# Patient Record
Sex: MALE | Employment: FULL TIME | URBAN - METROPOLITAN AREA
[De-identification: names, ages, dates, MRNs, and addresses within clinical notes are randomized per-mention and may not be internally consistent; named-entity substitution may affect disease eponyms.]

---

## 2018-10-23 ENCOUNTER — THERAPY VISIT (OUTPATIENT)
Dept: PHYSICAL THERAPY | Facility: CLINIC | Age: 28
End: 2018-10-23
Payer: OTHER MISCELLANEOUS

## 2018-10-23 DIAGNOSIS — M54.50 ACUTE RIGHT-SIDED LOW BACK PAIN WITHOUT SCIATICA: Primary | ICD-10-CM

## 2018-10-23 PROCEDURE — 97161 PT EVAL LOW COMPLEX 20 MIN: CPT | Mod: GP | Performed by: PHYSICAL THERAPIST

## 2018-10-23 PROCEDURE — 97110 THERAPEUTIC EXERCISES: CPT | Mod: GP | Performed by: PHYSICAL THERAPIST

## 2018-10-23 PROCEDURE — 97530 THERAPEUTIC ACTIVITIES: CPT | Mod: GP | Performed by: PHYSICAL THERAPIST

## 2018-10-23 NOTE — PROGRESS NOTES
[facility] Initial Evaluation        Subjective:  Patient is a 28 year old male presenting with rehab back hpi.   Alexandro Quinones is a 28 year old male with a lumbar condition.  Condition occurred with:  Lifting (Pt. injured his low back at work on 10-18 lifting a heavy load (65 lbs) and carrying it across the room. The next day was worse with difficulty with all movt. Since then he notes significant improvement with almost no pain or limitation. No hx of LBP.).  Condition occurred: at work.    10-18-18.    Patient reports pain:  Lumbar spine right.  Radiates to:  No radiation.  Pain is described as aching and is intermittent and reported as 1/10.  Associated symptoms:  Loss of strength. Worse during: no time pattern.  Symptoms are exacerbated by lifting and other (transfers) and relieved by rest.  Since onset symptoms are rapidly improving.  Special testing: none.  Previous treatment: none.    General health as reported by patient is fair.                                              Objective:  Standing Alignment:        Lumbar:  Normal  Pelvic:  Normal          Gait:    Gait Type:  Normal         Flexibility/Screens:   Positive screens:  Lumbar        Spine:  Normal           Lumbar/SI Evaluation  ROM:  AROM Lumbar: normal    Strength: lower abdominals 4/5; lumbar extensors 4/5  Lumbar Myotomes:  normal                Lumbar Dermtomes:  normal                Neural Tension/Mobility:      Left side:SLR; Slump or Irvin-Lumbar  negative.   Right side:   Irvin-Lumbar positive.  Right side:   SLR  negative.   Lumbar Palpation:  normal          Spinal Segmental Conclusions: No pain with P/A glide lumbar spine   Normal segmental mobility lumbar spine          SI joint/Sacrum:    SI joint provocation tests (-)                                                       General     ROS    Assessment/Plan:    Patient is a 28 year old male with lumbar complaints.    Patient has the following significant  findings with corresponding treatment plan.                Diagnosis 1:  LBP  Pain -  self management and education  Decreased strength - therapeutic exercise and therapeutic activities  Impaired muscle performance - neuro re-education  Decreased function - therapeutic activities    Therapy Evaluation Codes:   1) History comprised of:   Personal factors that impact the plan of care:      None.    Comorbidity factors that impact the plan of care are:      Overweight.     Medications impacting care: None.  2) Examination of Body Systems comprised of:   Body structures and functions that impact the plan of care:      Lumbar spine.   Activity limitations that impact the plan of care are:      Lifting.  3) Clinical presentation characteristics are:   Stable/Uncomplicated.  4) Decision-Making    Low complexity using standardized patient assessment instrument and/or measureable assessment of functional outcome.  Cumulative Therapy Evaluation is: Low complexity.    Previous and current functional limitations:  (See Goal Flow Sheet for this information)    Short term and Long term goals: (See Goal Flow Sheet for this information)     Communication ability:  Patient appears to be able to clearly communicate and understand verbal and written communication and follow directions correctly.  Treatment Explanation - The following has been discussed with the patient:   RX ordered/plan of care  Anticipated outcomes  Possible risks and side effects  This patient would benefit from PT intervention to resume normal activities.   Rehab potential is good.    Frequency:  2 X week, once daily  Duration:  for 1-2 weeks  Discharge Plan:  Achieve all LTG.  Independent in home treatment program.  Reach maximal therapeutic benefit.    Please refer to the daily flowsheet for treatment today, total treatment time and time spent performing 1:1 timed codes.

## 2018-10-23 NOTE — LETTER
[facility]   [address] [address]  [phone]    2018    Re: Alexandro Quinones   :   1990  MRN:  [id number]   REFERRING PHYSICIAN:   WENDY Geronimo    [facility] PH[facility]ICAL [facility]  Date of Initial Evaluation:  10/23/18  Visits:  Rxs Used: 1  Reason for Referral:  Acute right-sided low back pain without sciatica    EVALUATION SUMMARY    [facility]    Subjective:  Patient is a 28 year old male presenting with rehab back hpi.   Alexandro Quinones is a 28 year old male with a lumbar condition.  Condition occurred with:  Lifting (Pt. injured his low back at work on 10-18 lifting a heavy load (65 lbs) and carrying it across the room. The next day was worse with difficulty with all movt. Since then he notes significant improvement with almost no pain or limitation. No hx of LBP.).  Condition occurred: at work.    10-18-18.    Patient reports pain:  Lumbar spine right.  Radiates to:  No radiation.  Pain is described as aching and is intermittent and reported as 1/10.  Associated symptoms:  Loss of strength. Worse during: no time pattern.  Symptoms are exacerbated by lifting and other (transfers) and relieved by rest.  Since onset symptoms are rapidly improving.  Special testing: none.  Previous treatment: none.    General health as reported by patient is fair.  Pertinent medical history includes:  Depression and overweight.  Current medications:  Steroids.  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    Primary job tasks include:  Lifting, prolonged standing and repetitive tasks.                Objective:  Standing Alignment:    Lumbar:  Normal  Pelvic:  Normal    Gait:    Gait Type:  Normal       Flexibility/Screens:   Positive screens:  Lumbar    Spine:  Normal      Lumbar/SI Evaluation  ROM:  AROM Lumbar: normal  Strength: lower  abdominals 4/5; lumbar extensors 4/5    Lumbar Myotomes:  normal    Lumbar Dermtomes:  normal      Re: Alexandro Quinones    Neural Tension/Mobility:    Left side:SLR; Slump or Irvin-Lumbar  negative.   Right side:   Irvin-Lumbar positive.  Right side:   SLR  negative.     Lumbar Palpation:  normal    Spinal Segmental Conclusions: No pain with P/A glide lumbar spine   Normal segmental mobility lumbar spine    SI joint/Sacrum:    SI joint provocation tests (-)    Assessment/Plan:    Patient is a 28 year old male with lumbar complaints.    Patient has the following significant findings with corresponding treatment plan.                Diagnosis 1:  LBP  Pain -  self management and education  Decreased strength - therapeutic exercise and therapeutic activities  Impaired muscle performance - neuro re-education  Decreased function - therapeutic activities    Therapy Evaluation Codes:   1) History comprised of:   Personal factors that impact the plan of care:      None.    Comorbidity factors that impact the plan of care are:      Overweight.     Medications impacting care: None.  2) Examination of Body Systems comprised of:   Body structures and functions that impact the plan of care:      Lumbar spine.   Activity limitations that impact the plan of care are:      Lifting.  3) Clinical presentation characteristics are:   Stable/Uncomplicated.  4) Decision-Making    Low complexity using standardized patient assessment instrument and/or measureable assessment of functional outcome.  Cumulative Therapy Evaluation is: Low complexity.    Previous and current functional limitations:  (See Goal Flow Sheet for this information)    Short term and Long term goals: (See Goal Flow Sheet for this information)     Communication ability:  Patient appears to be able to clearly communicate and understand verbal and written communication and follow directions correctly.  Treatment Explanation - The following has been discussed with the  patient:   RX ordered/plan of care  Anticipated outcomes  Possible risks and side effects  This patient would benefit from PT intervention to resume normal activities.   Rehab potential is good.    Frequency:  2 X week, once daily  Duration:  for 1-2 weeks  Discharge Plan:  Achieve all LTG.  Independent in home treatment program.  Reach maximal therapeutic benefit.  Re: Alexandro Quinones    Please refer to the daily flowsheet for treatment today, total treatment time and time spent performing 1:1 timed codes.     Thank you for your referral.    INQUIRIES  Therapist: Debbi Banks, PT   [facility] [place] [facility] PH[facility]ICAL THERAPY  [address] [place] [id number]  Phone: [phone]  Fax: [phone]

## 2018-10-23 NOTE — MR AVS SNAPSHOT
After Visit Summary   10/23/2018    Alexandro Quinones    MRN: [id number]           Patient Information     Date Of Birth          1990        Visit Information        Provider Department      10/23/2018 8:10 AM Debbi Banks PT [facility] Physical Therapy        Today's Diagnoses     Acute right-sided low back pain without sciatica    -  1       Follow-ups after your visit        Your next 10 appointments already scheduled     Oct 26, 2018 10:10 AM CDT   SOHEILA Spine with Mindy Medrano PTA   [facility] Physical Therapy ([facility]  )    [address] [place] [address] [phone]              Who to contact     If you have questions or need follow up information about today's clinic visit or your schedule please contact [facility] ATH[facility]IC [facility] PHYSICAL [facility] directly at [phone].  Normal or non-critical lab and imaging results will be communicated to you by MyChart, letter or phone within 4 business days after the clinic has received the results. If you do not hear from us within 7 days, please contact the clinic through MyChart or phone. If you have a critical or abnormal lab result, we will notify you by phone as soon as possible.  Submit refill requests through [employer] or call your pharmacy and they will forward the refill request to us. Please allow 3 business days for your refill to be completed.          Additional Information About Your Visit        Care EveryWhere ID     This is your Care EveryWhere ID. This could be used by other organizations to access your [place] medical records  [id number]         Blood Pressure from Last 3 Encounters:   No data found for BP    Weight from Last 3 Encounters:   No data found for Wt              We Performed the Following     SOHEILA Inital Eval Report     PT Eval, Low Complexity (15608)     Therapeutic Activities     Therapeutic Exercises         Primary Care Provider Fax #    Physician No Ref-Primary [phone]       No address on file        Equal Access to Services     RUSSELLAMIE HEDY : Hadii aad ku hadestersandra Tawannaali, priscila jamearoldoha, genie diego robertshahriar, tesfaye enriquetain hayaajamey leslee ness labhaktijamey naida. So [facility] [phone].    ATENCIÓN: Si habla español, tiene a hewitt disposición servicios gratuitos de asistencia lingüística. Llame al [phone].    We comply with applicable federal civil rights laws and [place] laws. We do not discriminate on the basis of race, color, national origin, age, disability, sex, sexual orientation, or gender identity.            Thank you!     Thank you for choosing [place] [facility] PHYSICAL THERAPY  for your care. Our goal is always to provide you with excellent care. Hearing back from our patients is one way we can continue to improve our services. Please take a few minutes to complete the written survey that you may receive in the mail after your visit with us. Thank you!             Your Updated Medication List - Protect others around you: Learn how to safely use, store and throw away your medicines at www.disposemymeds.org.      Notice  As of 10/23/2018  9:39 AM    You have not been prescribed any medications.

## 2018-10-24 NOTE — PROGRESS NOTES
[facility] Initial Evaluation  Subjective:  Patient is a 28 year old male presenting with rehab back hpi.                                      Pertinent medical history includes:  Depression and overweight.      Current medications:  Steroids.  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    Primary job tasks include:  Lifting, prolonged standing and repetitive tasks.                                Objective:  System    Physical Exam    General     ROS    Assessment/Plan:

## 2018-10-26 ENCOUNTER — THERAPY VISIT (OUTPATIENT)
Dept: PHYSICAL THERAPY | Facility: CLINIC | Age: 28
End: 2018-10-26
Payer: OTHER MISCELLANEOUS

## 2018-10-26 DIAGNOSIS — M54.50 ACUTE RIGHT-SIDED LOW BACK PAIN WITHOUT SCIATICA: ICD-10-CM

## 2018-10-26 PROCEDURE — 97530 THERAPEUTIC ACTIVITIES: CPT | Mod: GP

## 2018-10-26 PROCEDURE — 97035 APP MDLTY 1+ULTRASOUND EA 15: CPT | Mod: GP

## 2018-10-26 PROCEDURE — 97110 THERAPEUTIC EXERCISES: CPT | Mod: GP

## 2018-10-30 ENCOUNTER — THERAPY VISIT (OUTPATIENT)
Dept: PHYSICAL THERAPY | Facility: CLINIC | Age: 28
End: 2018-10-30
Payer: OTHER MISCELLANEOUS

## 2018-10-30 DIAGNOSIS — M54.50 ACUTE RIGHT-SIDED LOW BACK PAIN WITHOUT SCIATICA: ICD-10-CM

## 2018-10-30 PROCEDURE — 97110 THERAPEUTIC EXERCISES: CPT | Mod: GP

## 2018-10-30 PROCEDURE — 97530 THERAPEUTIC ACTIVITIES: CPT | Mod: GP

## 2018-10-30 NOTE — MR AVS SNAPSHOT
After Visit Summary   10/30/2018    Alexandro Quinones    MRN: [id number]           Patient Information     Date Of Birth          1990        Visit Information        Provider Department      10/30/2018 10:10 AM Mindy Medrano PTA [facility] Physical Therapy        Today's Diagnoses     Acute right-sided low back pain without sciatica           Follow-ups after your visit        Your next 10 appointments already scheduled     Nov 05, 2018 10:10 AM CST   SOHEILA Spine with Mindy Medrano PTA   [facility] Physical Therapy ([facility]  )    [address] [place] [id number] [phone]              Who to contact     If you have questions or need follow up information about today's clinic visit or your schedule please contact [facility] ATHLETIC [facility] PHYSICAL THERAPY directly at [phone].  Normal or non-critical lab and imaging results will be communicated to you by MyChart, letter or phone within 4 business days after the clinic has received the results. If you do not hear from us within 7 days, please contact the clinic through MyChart or phone. If you have a critical or abnormal lab result, we will notify you by phone as soon as possible.  Submit refill requests through [employer] or call your pharmacy and they will forward the refill request to us. Please allow 3 business days for your refill to be completed.          Additional Information About Your Visit        Care EveryWhere ID     This is your Care EveryWhere ID. This could be used by other organizations to access your [place] medical records  [id number]         Blood Pressure from Last 3 Encounters:   No data found for BP    Weight from Last 3 Encounters:   No data found for Wt              We Performed the Following     Therapeutic Activities     Therapeutic Exercises        Primary Care Provider Fax #    Physician No Ref-Primary  [phone]       No address on file        Equal Access to Services     GAYE HEDY : Hadii aad ku hadestersandra Jerry, priscila doan, genie lopezmagerardo guthrie, tesfaye gandhiangelitoperla pascal. So [facility] [phone].    ATENCIÓN: Si habla español, tiene a hewitt disposición servicios gratuitos de asistencia lingüística. Llame al [phone].    We comply with applicable federal civil rights laws and [place] laws. We do not discriminate on the basis of race, color, national origin, age, disability, sex, sexual orientation, or gender identity.            Thank you!     Thank you for choosing [place] [facility] PHYSICAL THERAPY  for your care. Our goal is always to provide you with excellent care. Hearing back from our patients is one way we can continue to improve our services. Please take a few minutes to complete the written survey that you may receive in the mail after your visit with us. Thank you!             Your Updated Medication List - Protect others around you: Learn how to safely use, store and throw away your medicines at www.disposemymeds.org.      Notice  As of 10/30/2018 12:43 PM    You have not been prescribed any medications.

## 2018-10-30 NOTE — PROGRESS NOTES
Subjective:  HPI                    Objective:  System    Physical Exam    General     ROS    Assessment/Plan:    SUBJECTIVE  Subjective changes as noted by pt:   Back is feeling pretty good, could stand 3 hours yesterday, but now can stand as long as he chooses. As long as he's moving around he is fine.    Current pain level: 1/10 Current Pain level: 1/10   Changes in function:  Yes (See Goal flowsheet attached for changes in current functional level)     Adverse reaction to treatment or activity:  None    OBJECTIVE  Changes in objective findings:  Yes,   Objective: Lumbar AROM WNL, freq exten stretches helpful throughout the day due to long periods spent leaning forward for job tasks Abdom strength 4/5, improved control with mini bridging. Lifts up to 35# waist to floor for leg work in clinic        ASSESSMENT  Alexandro continues to require intervention to meet STG and LTG's: PT  Patient's symptoms are resolving.  Response to therapy has shown an improvement in  pain level, ROM , muscle control and body mechanics  Progress made towards STG/LTG?  Yes (See Goal flowsheet attached for updates on achievement of STG and LTG)    PLAN  Current treatment program is being advanced to more complex exercises.    PTA/ATC plan:  Will continue with present plan of care.    Please refer to the daily flowsheet for treatment today, total treatment time and time spent performing 1:1 timed codes.

## 2018-11-05 ENCOUNTER — THERAPY VISIT (OUTPATIENT)
Dept: PHYSICAL THERAPY | Facility: CLINIC | Age: 28
End: 2018-11-05
Payer: OTHER MISCELLANEOUS

## 2018-11-05 DIAGNOSIS — M54.50 ACUTE RIGHT-SIDED LOW BACK PAIN WITHOUT SCIATICA: ICD-10-CM

## 2018-11-05 PROCEDURE — 97530 THERAPEUTIC ACTIVITIES: CPT | Mod: GP

## 2018-11-05 PROCEDURE — 97110 THERAPEUTIC EXERCISES: CPT | Mod: GP

## 2018-11-05 NOTE — LETTER
[facility] PHYSICAL THERAPY  [address] [place] [address]  [phone]    2018    Re: Alexandro Quinones   :   1990  MRN:  [id number]   REFERRING PHYSICIAN:   Geri Rice PA-C    [facility] ATHLET[facility] PHYSICAL [facility]  Date of Initial Evaluation:  10/23/18  Visits:  Rxs Used: 4  Reason for Referral:  Acute right-sided low back pain without sciatica     DISCHARGE REPORT  Progress reporting period is from 10/23/18 to 18.       SUBJECTIVE  Subjective changes noted by patient:   Pt reports muscle soreness in legs for 3 days after last visit. Not used to working out with squats as we did. Back has been feeling good.     Current pain level is 0/10  .     Previous pain level was  1/10 Initial Pain level: 1/10.   Changes in function:  Yes (See Goal flowsheet attached for changes in current functional level)  Adverse reaction to treatment or activity: None    OBJECTIVE  Changes noted in objective findings:  Yes,   Objective: Lumbar AROM WNL. Abdom strength 4/5, glute max 4+/5. Demonstrates good squat technique with lifting. Would benefit from cont abdom, glute, leg strengthening independently via home program. Hamstring flexibility L 50, R 60.      ASSESSMENT/PLAN  Updated problem list and treatment plan: Diagnosis 1:  LBP  Decreased strength - therapeutic exercise, therapeutic activities and home program  STG/LTGs have been met or progress has been made towards goals:  Yes (See Goal flow sheet completed today.)  Assessment of Progress: The patient's condition is improving.  Self Management Plans:  Patient has been instructed in a home treatment program.  Patient is independent in a home treatment program.  I have re-evaluated this patient and find that the nature, scope, duration and intensity of the therapy is appropriate for the medical condition of the patient.  Alexandro continues to require the following  intervention to meet STG and LTG's:  PT intervention is no longer required to meet STG/LTG.    Recommendations:  This patient is ready to be discharged from therapy and continue their home treatment program.  The progress note/discharge summary was written in collaboration with and reviewed by the physical therapist.    Please refer to the daily flowsheet for treatment today, total treatment time and time spent performing 1:1 timed codes.            Thank you for your referral.    INQUIRIES  Therapist: Mindy Medrano PTA, [facility]  [facility] PHYSICAL THERAPY  [address] [place] [address]  Phone: [phone]  Fax: [phone]

## 2018-11-05 NOTE — MR AVS SNAPSHOT
After Visit Summary   11/5/2018    Alexandro Quinones    MRN: [id number]           Patient Information     Date Of Birth          1990        Visit Information        Provider Department      11/5/2018 10:10 AM Mindy Medrano PTA [facility] Physical Therapy        Today's Diagnoses     Acute right-sided low back pain without sciatica           Follow-ups after your visit        Who to contact     If you have questions or need follow up information about today's clinic visit or your schedule please contact [facility] ATHLETIC [facility] PHYSICAL THERAPY directly at [phone].  Normal or non-critical lab and imaging results will be communicated to you by MyChart, letter or phone within 4 business days after the clinic has received the results. If you do not hear from us within 7 days, please contact the clinic through MyChart or phone. If you have a critical or abnormal lab result, we will notify you by phone as soon as possible.  Submit refill requests through [employer] or call your pharmacy and they will forward the refill request to us. Please allow 3 business days for your refill to be completed.          Additional Information About Your Visit        Care EveryWhere ID     This is your Care EveryWhere ID. This could be used by other organizations to access your [place] medical records  [id number]         Blood Pressure from Last 3 Encounters:   No data found for BP    Weight from Last 3 Encounters:   No data found for Wt              We Performed the Following     SOHEILA Progress Notes Report     Therapeutic Activities     Therapeutic Exercises        Primary Care Provider Fax #    Physician No Ref-Primary [phone]       No address on file        Equal Access to Services     GAYE KNOTT : Tarun Jerry, priscila doan, tesfaye contreras. So [facility] [phone].    ATENCIÓN: Si  penny dela cruz, tiene a hewitt disposición servicios gratuitos de asistencia lingüística. Marycarmen garcia [phone].    We comply with applicable federal civil rights laws and [place] laws. We do not discriminate on the basis of race, color, national origin, age, disability, sex, sexual orientation, or gender identity.            Thank you!     Thank you for choosing [place] [facility] PHYSICAL THERAPY  for your care. Our goal is always to provide you with excellent care. Hearing back from our patients is one way we can continue to improve our services. Please take a few minutes to complete the written survey that you may receive in the mail after your visit with us. Thank you!             Your Updated Medication List - Protect others around you: Learn how to safely use, store and throw away your medicines at www.disposemymeds.org.      Notice  As of 11/5/2018  1:54 PM    You have not been prescribed any medications.

## 2018-11-05 NOTE — PROGRESS NOTES
Subjective:  HPI                    Objective:  System    Physical Exam    General     ROS    Assessment/Plan:    DISCHARGE REPORT    Progress reporting period is from 10/23/18 to 11/5/18.       SUBJECTIVE  Subjective changes noted by patient:   Pt reports muscle soreness in legs for 3 days after last visit. Not used to working out with squats as we did. Back has been feeling good.     Current pain level is 0/10  .     Previous pain level was  1/10 Initial Pain level: 1/10.   Changes in function:  Yes (See Goal flowsheet attached for changes in current functional level)  Adverse reaction to treatment or activity: None    OBJECTIVE  Changes noted in objective findings:  Yes,   Objective: Lumbar AROM WNL. Abdom strength 4/5, glute max 4+/5. Demonstrates good squat technique with lifting. Would benefit from cont abdom, glute, leg strengthening independently via home program. Hamstring flexibility L 50, R 60.      ASSESSMENT/PLAN  Updated problem list and treatment plan: Diagnosis 1:  LBP  Decreased strength - therapeutic exercise, therapeutic activities and home program  STG/LTGs have been met or progress has been made towards goals:  Yes (See Goal flow sheet completed today.)  Assessment of Progress: The patient's condition is improving.  Self Management Plans:  Patient has been instructed in a home treatment program.  Patient is independent in a home treatment program.  I have re-evaluated this patient and find that the nature, scope, duration and intensity of the therapy is appropriate for the medical condition of the patient.  Alexandro continues to require the following intervention to meet STG and LTG's:  PT intervention is no longer required to meet STG/LTG.    Recommendations:  This patient is ready to be discharged from therapy and continue their home treatment program.  The progress note/discharge summary was written in collaboration with and reviewed by the physical therapist.    Please refer to the daily  flowsheet for treatment today, total treatment time and time spent performing 1:1 timed codes.